# Patient Record
Sex: FEMALE | Race: WHITE | Employment: FULL TIME | URBAN - METROPOLITAN AREA
[De-identification: names, ages, dates, MRNs, and addresses within clinical notes are randomized per-mention and may not be internally consistent; named-entity substitution may affect disease eponyms.]

---

## 2022-03-22 LAB
C. TRACHOMATIS, EXTERNAL RESULT: NEGATIVE
GBS, EXTERNAL RESULT: POSITIVE
HEP B, EXTERNAL RESULT: NEGATIVE
HIV, EXTERNAL RESULT: NEGATIVE
N. GONORRHOEAE, EXTERNAL RESULT: NEGATIVE
RPR, EXTERNAL RESULT: NEGATIVE
RUBELLA TITER, EXTERNAL RESULT: NORMAL

## 2022-10-18 LAB
ABO, EXTERNAL RESULT: NORMAL
RH FACTOR, EXTERNAL RESULT: NEGATIVE

## 2022-10-27 ENCOUNTER — PREP FOR PROCEDURE (OUTPATIENT)
Dept: OBGYN | Age: 37
End: 2022-10-27

## 2022-10-27 RX ORDER — MISOPROSTOL 100 UG/1
800 TABLET ORAL PRN
Status: CANCELLED | OUTPATIENT
Start: 2022-10-27

## 2022-10-27 RX ORDER — METHYLERGONOVINE MALEATE 0.2 MG/ML
200 INJECTION INTRAVENOUS PRN
Status: CANCELLED | OUTPATIENT
Start: 2022-10-27

## 2022-10-27 RX ORDER — ONDANSETRON 2 MG/ML
4 INJECTION INTRAMUSCULAR; INTRAVENOUS EVERY 6 HOURS PRN
Status: CANCELLED | OUTPATIENT
Start: 2022-10-27

## 2022-10-27 RX ORDER — SODIUM CHLORIDE 0.9 % (FLUSH) 0.9 %
5-40 SYRINGE (ML) INJECTION EVERY 12 HOURS SCHEDULED
Status: CANCELLED | OUTPATIENT
Start: 2022-10-27

## 2022-10-27 RX ORDER — SODIUM CHLORIDE 0.9 % (FLUSH) 0.9 %
5-40 SYRINGE (ML) INJECTION PRN
Status: CANCELLED | OUTPATIENT
Start: 2022-10-27

## 2022-10-27 RX ORDER — SODIUM CHLORIDE, SODIUM LACTATE, POTASSIUM CHLORIDE, CALCIUM CHLORIDE 600; 310; 30; 20 MG/100ML; MG/100ML; MG/100ML; MG/100ML
INJECTION, SOLUTION INTRAVENOUS CONTINUOUS
Status: CANCELLED | OUTPATIENT
Start: 2022-10-27

## 2022-10-27 RX ORDER — SODIUM CHLORIDE, SODIUM LACTATE, POTASSIUM CHLORIDE, AND CALCIUM CHLORIDE .6; .31; .03; .02 G/100ML; G/100ML; G/100ML; G/100ML
500 INJECTION, SOLUTION INTRAVENOUS PRN
Status: CANCELLED | OUTPATIENT
Start: 2022-10-27

## 2022-10-27 RX ORDER — SODIUM CHLORIDE 9 MG/ML
25 INJECTION, SOLUTION INTRAVENOUS PRN
Status: CANCELLED | OUTPATIENT
Start: 2022-10-27

## 2022-10-27 RX ORDER — CARBOPROST TROMETHAMINE 250 UG/ML
250 INJECTION, SOLUTION INTRAMUSCULAR PRN
Status: CANCELLED | OUTPATIENT
Start: 2022-10-27

## 2022-10-27 RX ORDER — SODIUM CHLORIDE, SODIUM LACTATE, POTASSIUM CHLORIDE, AND CALCIUM CHLORIDE .6; .31; .03; .02 G/100ML; G/100ML; G/100ML; G/100ML
1000 INJECTION, SOLUTION INTRAVENOUS PRN
Status: CANCELLED | OUTPATIENT
Start: 2022-10-27

## 2022-11-08 ENCOUNTER — ANESTHESIA (OUTPATIENT)
Dept: LABOR AND DELIVERY | Age: 37
End: 2022-11-08
Payer: COMMERCIAL

## 2022-11-08 ENCOUNTER — APPOINTMENT (OUTPATIENT)
Dept: LABOR AND DELIVERY | Age: 37
End: 2022-11-08
Payer: COMMERCIAL

## 2022-11-08 ENCOUNTER — ANESTHESIA EVENT (OUTPATIENT)
Dept: LABOR AND DELIVERY | Age: 37
End: 2022-11-08
Payer: COMMERCIAL

## 2022-11-08 ENCOUNTER — HOSPITAL ENCOUNTER (INPATIENT)
Age: 37
LOS: 2 days | Discharge: HOME | End: 2022-11-10
Attending: OBSTETRICS & GYNECOLOGY | Admitting: OBSTETRICS & GYNECOLOGY
Payer: COMMERCIAL

## 2022-11-08 PROBLEM — Z98.890 STATUS POST INDUCTION OF LABOR: Status: ACTIVE | Noted: 2022-11-08

## 2022-11-08 LAB
ABO/RH: NORMAL
AMPHETAMINE SCREEN, URINE: NORMAL
ANTIBODY IDENTIFICATION: NORMAL
ANTIBODY SCREEN: NORMAL
BARBITURATE SCREEN URINE: NORMAL
BENZODIAZEPINE SCREEN, URINE: NORMAL
BUPRENORPHINE URINE: NORMAL
CANNABINOID SCREEN URINE: NORMAL
COCAINE METABOLITE SCREEN URINE: NORMAL
DAT IGG CAPTURE: NORMAL
FENTANYL SCREEN, URINE: NORMAL
HCT VFR BLD CALC: 32.6 % (ref 36–48)
HEMOGLOBIN: 11.3 G/DL (ref 12–16)
Lab: NORMAL
MCH RBC QN AUTO: 30.3 PG (ref 26–34)
MCHC RBC AUTO-ENTMCNC: 34.5 G/DL (ref 31–36)
MCV RBC AUTO: 87.9 FL (ref 80–100)
METHADONE SCREEN, URINE: NORMAL
OPIATE SCREEN URINE: NORMAL
OXYCODONE URINE: NORMAL
PDW BLD-RTO: 15.3 % (ref 12.4–15.4)
PH UA: 5.5
PHENCYCLIDINE SCREEN URINE: NORMAL
PLATELET # BLD: 273 K/UL (ref 135–450)
PMV BLD AUTO: 8.8 FL (ref 5–10.5)
RBC # BLD: 3.71 M/UL (ref 4–5.2)
TOTAL SYPHILLIS IGG/IGM: NORMAL
WBC # BLD: 8.8 K/UL (ref 4–11)

## 2022-11-08 PROCEDURE — 2500000003 HC RX 250 WO HCPCS: Performed by: NURSE ANESTHETIST, CERTIFIED REGISTERED

## 2022-11-08 PROCEDURE — 86901 BLOOD TYPING SEROLOGIC RH(D): CPT

## 2022-11-08 PROCEDURE — 6360000002 HC RX W HCPCS: Performed by: OBSTETRICS & GYNECOLOGY

## 2022-11-08 PROCEDURE — 85027 COMPLETE CBC AUTOMATED: CPT

## 2022-11-08 PROCEDURE — 51701 INSERT BLADDER CATHETER: CPT

## 2022-11-08 PROCEDURE — 86850 RBC ANTIBODY SCREEN: CPT

## 2022-11-08 PROCEDURE — 1220000000 HC SEMI PRIVATE OB R&B

## 2022-11-08 PROCEDURE — 86922 COMPATIBILITY TEST ANTIGLOB: CPT

## 2022-11-08 PROCEDURE — 2500000003 HC RX 250 WO HCPCS: Performed by: ANESTHESIOLOGY

## 2022-11-08 PROCEDURE — 3E033VJ INTRODUCTION OF OTHER HORMONE INTO PERIPHERAL VEIN, PERCUTANEOUS APPROACH: ICD-10-PCS | Performed by: OBSTETRICS & GYNECOLOGY

## 2022-11-08 PROCEDURE — 86900 BLOOD TYPING SEROLOGIC ABO: CPT

## 2022-11-08 PROCEDURE — 2580000003 HC RX 258: Performed by: OBSTETRICS & GYNECOLOGY

## 2022-11-08 PROCEDURE — 3700000025 EPIDURAL BLOCK: Performed by: ANESTHESIOLOGY

## 2022-11-08 PROCEDURE — 80307 DRUG TEST PRSMV CHEM ANLYZR: CPT

## 2022-11-08 PROCEDURE — 86780 TREPONEMA PALLIDUM: CPT

## 2022-11-08 PROCEDURE — 51702 INSERT TEMP BLADDER CATH: CPT

## 2022-11-08 PROCEDURE — 6360000002 HC RX W HCPCS

## 2022-11-08 PROCEDURE — 59025 FETAL NON-STRESS TEST: CPT

## 2022-11-08 PROCEDURE — 86870 RBC ANTIBODY IDENTIFICATION: CPT

## 2022-11-08 PROCEDURE — 6360000002 HC RX W HCPCS: Performed by: NURSE ANESTHETIST, CERTIFIED REGISTERED

## 2022-11-08 PROCEDURE — 86880 COOMBS TEST DIRECT: CPT

## 2022-11-08 RX ORDER — CARBOPROST TROMETHAMINE 250 UG/ML
250 INJECTION, SOLUTION INTRAMUSCULAR PRN
Status: DISCONTINUED | OUTPATIENT
Start: 2022-11-08 | End: 2022-11-10 | Stop reason: HOSPADM

## 2022-11-08 RX ORDER — LIDOCAINE HYDROCHLORIDE 10 MG/ML
INJECTION, SOLUTION INFILTRATION; PERINEURAL PRN
Status: DISCONTINUED | OUTPATIENT
Start: 2022-11-08 | End: 2022-11-09 | Stop reason: SDUPTHER

## 2022-11-08 RX ORDER — SODIUM CHLORIDE 0.9 % (FLUSH) 0.9 %
5-40 SYRINGE (ML) INJECTION EVERY 12 HOURS SCHEDULED
Status: DISCONTINUED | OUTPATIENT
Start: 2022-11-08 | End: 2022-11-09 | Stop reason: SDUPTHER

## 2022-11-08 RX ORDER — LIDOCAINE HYDROCHLORIDE AND EPINEPHRINE 15; 5 MG/ML; UG/ML
INJECTION, SOLUTION EPIDURAL PRN
Status: DISCONTINUED | OUTPATIENT
Start: 2022-11-08 | End: 2022-11-09 | Stop reason: SDUPTHER

## 2022-11-08 RX ORDER — ONDANSETRON 2 MG/ML
4 INJECTION INTRAMUSCULAR; INTRAVENOUS EVERY 6 HOURS PRN
Status: DISCONTINUED | OUTPATIENT
Start: 2022-11-08 | End: 2022-11-09 | Stop reason: HOSPADM

## 2022-11-08 RX ORDER — METHYLERGONOVINE MALEATE 0.2 MG/ML
200 INJECTION INTRAVENOUS PRN
Status: DISCONTINUED | OUTPATIENT
Start: 2022-11-08 | End: 2022-11-10 | Stop reason: HOSPADM

## 2022-11-08 RX ORDER — SODIUM CHLORIDE 9 MG/ML
25 INJECTION, SOLUTION INTRAVENOUS PRN
Status: DISCONTINUED | OUTPATIENT
Start: 2022-11-08 | End: 2022-11-09 | Stop reason: SDUPTHER

## 2022-11-08 RX ORDER — SODIUM CHLORIDE, SODIUM LACTATE, POTASSIUM CHLORIDE, AND CALCIUM CHLORIDE .6; .31; .03; .02 G/100ML; G/100ML; G/100ML; G/100ML
500 INJECTION, SOLUTION INTRAVENOUS PRN
Status: DISCONTINUED | OUTPATIENT
Start: 2022-11-08 | End: 2022-11-10 | Stop reason: HOSPADM

## 2022-11-08 RX ORDER — EPHEDRINE SULFATE/0.9% NACL/PF 50 MG/5 ML
SYRINGE (ML) INTRAVENOUS PRN
Status: DISCONTINUED | OUTPATIENT
Start: 2022-11-08 | End: 2022-11-09 | Stop reason: SDUPTHER

## 2022-11-08 RX ORDER — MISOPROSTOL 200 UG/1
800 TABLET ORAL PRN
Status: DISCONTINUED | OUTPATIENT
Start: 2022-11-08 | End: 2022-11-10 | Stop reason: HOSPADM

## 2022-11-08 RX ORDER — SODIUM CHLORIDE 0.9 % (FLUSH) 0.9 %
5-40 SYRINGE (ML) INJECTION PRN
Status: DISCONTINUED | OUTPATIENT
Start: 2022-11-08 | End: 2022-11-09 | Stop reason: SDUPTHER

## 2022-11-08 RX ORDER — ONDANSETRON 2 MG/ML
4 INJECTION INTRAMUSCULAR; INTRAVENOUS EVERY 6 HOURS PRN
Status: DISCONTINUED | OUTPATIENT
Start: 2022-11-08 | End: 2022-11-08 | Stop reason: SDUPTHER

## 2022-11-08 RX ORDER — NALOXONE HYDROCHLORIDE 0.4 MG/ML
INJECTION, SOLUTION INTRAMUSCULAR; INTRAVENOUS; SUBCUTANEOUS PRN
Status: DISCONTINUED | OUTPATIENT
Start: 2022-11-08 | End: 2022-11-09 | Stop reason: HOSPADM

## 2022-11-08 RX ORDER — BUPIVACAINE HYDROCHLORIDE 2.5 MG/ML
INJECTION, SOLUTION EPIDURAL; INFILTRATION; INTRACAUDAL PRN
Status: DISCONTINUED | OUTPATIENT
Start: 2022-11-08 | End: 2022-11-09 | Stop reason: SDUPTHER

## 2022-11-08 RX ORDER — SODIUM CHLORIDE, SODIUM LACTATE, POTASSIUM CHLORIDE, AND CALCIUM CHLORIDE .6; .31; .03; .02 G/100ML; G/100ML; G/100ML; G/100ML
1000 INJECTION, SOLUTION INTRAVENOUS PRN
Status: DISCONTINUED | OUTPATIENT
Start: 2022-11-08 | End: 2022-11-10 | Stop reason: HOSPADM

## 2022-11-08 RX ORDER — SODIUM CHLORIDE, SODIUM LACTATE, POTASSIUM CHLORIDE, CALCIUM CHLORIDE 600; 310; 30; 20 MG/100ML; MG/100ML; MG/100ML; MG/100ML
INJECTION, SOLUTION INTRAVENOUS CONTINUOUS
Status: DISCONTINUED | OUTPATIENT
Start: 2022-11-08 | End: 2022-11-09 | Stop reason: SDUPTHER

## 2022-11-08 RX ORDER — DIPHENHYDRAMINE HYDROCHLORIDE 50 MG/ML
12.5 INJECTION INTRAMUSCULAR; INTRAVENOUS ONCE
Status: COMPLETED | OUTPATIENT
Start: 2022-11-08 | End: 2022-11-08

## 2022-11-08 RX ADMIN — DIPHENHYDRAMINE HYDROCHLORIDE 12.5 MG: 50 INJECTION INTRAMUSCULAR; INTRAVENOUS at 22:22

## 2022-11-08 RX ADMIN — Medication 10 MG: at 20:15

## 2022-11-08 RX ADMIN — SODIUM CHLORIDE, SODIUM LACTATE, POTASSIUM CHLORIDE, AND CALCIUM CHLORIDE 500 ML: .6; .31; .03; .02 INJECTION, SOLUTION INTRAVENOUS at 17:00

## 2022-11-08 RX ADMIN — DEXTROSE MONOHYDRATE 5 MILLION UNITS: 5 INJECTION INTRAVENOUS at 08:44

## 2022-11-08 RX ADMIN — LIDOCAINE HYDROCHLORIDE 3 ML: 10 INJECTION, SOLUTION INFILTRATION; PERINEURAL at 14:25

## 2022-11-08 RX ADMIN — Medication 2.5 MILLION UNITS: at 13:31

## 2022-11-08 RX ADMIN — Medication 12 ML/HR: at 23:01

## 2022-11-08 RX ADMIN — Medication 1 MILLI-UNITS/MIN: at 08:40

## 2022-11-08 RX ADMIN — BUPIVACAINE HYDROCHLORIDE 10 ML: 2.5 INJECTION, SOLUTION EPIDURAL; INFILTRATION; INTRACAUDAL at 14:35

## 2022-11-08 RX ADMIN — Medication 2.5 MILLION UNITS: at 22:33

## 2022-11-08 RX ADMIN — SODIUM CHLORIDE, POTASSIUM CHLORIDE, SODIUM LACTATE AND CALCIUM CHLORIDE: 600; 310; 30; 20 INJECTION, SOLUTION INTRAVENOUS at 14:48

## 2022-11-08 RX ADMIN — SODIUM CHLORIDE, POTASSIUM CHLORIDE, SODIUM LACTATE AND CALCIUM CHLORIDE: 600; 310; 30; 20 INJECTION, SOLUTION INTRAVENOUS at 19:29

## 2022-11-08 RX ADMIN — Medication 2.5 MILLION UNITS: at 18:35

## 2022-11-08 RX ADMIN — LIDOCAINE HYDROCHLORIDE AND EPINEPHRINE 3 ML: 15; 5 INJECTION, SOLUTION EPIDURAL at 14:32

## 2022-11-08 RX ADMIN — Medication 12 ML/HR: at 14:50

## 2022-11-08 RX ADMIN — SODIUM CHLORIDE, POTASSIUM CHLORIDE, SODIUM LACTATE AND CALCIUM CHLORIDE: 600; 310; 30; 20 INJECTION, SOLUTION INTRAVENOUS at 08:32

## 2022-11-08 NOTE — ANESTHESIA PROCEDURE NOTES
Epidural Block    Patient location during procedure: OB  Start time: 11/8/2022 2:23 PM  End time: 11/8/2022 2:40 PM  Reason for block: labor epidural  Staffing  Resident/CRNA: Omar Wade [facility], APRN - CRNA  Epidural  Patient position: sitting  Prep: ChloraPrep  Patient monitoring: cardiac monitor and continuous pulse ox  Approach: midline  Location: L3-4  Injection technique: TRINIDAD air  Guidance: paresthesia technique  Provider prep: mask and sterile gloves  Needle  Needle type: Tuohy   Needle gauge: 17 G  Needle length: 3.5 in  Needle insertion depth: 7 cm  Catheter type: side hole  Catheter size: 19 G  Catheter at skin depth: 13 cm  Test dose: negativeCatheter Secured: tegaderm and tape  Assessment  Sensory level: T8  Hemodynamics: stable  Attempts: 1  Outcomes: uncomplicated and patient tolerated procedure well  Preanesthetic Checklist  Completed: patient identified, IV checked, site marked, risks and benefits discussed, surgical/procedural consents, equipment checked, pre-op evaluation, timeout performed, anesthesia consent given, oxygen available, monitors applied/VS acknowledged, fire risk safety assessment completed and verbalized and blood product R/B/A discussed and consented

## 2022-11-08 NOTE — ANESTHESIA PRE PROCEDURE
Department of Anesthesiology  Preprocedure Note       Name:  Thad Trevizo   Age:  44 y.o.  :  1985                                          MRN:  [id number]         Date:  2022      Surgeon: * No surgeons listed *    Procedure: * No procedures listed *    Medications prior to admission:   Prior to Admission medications    Medication Sig Start Date End Date Taking?  Authorizing Provider   Prenatal MV-Min-Fe Fum-FA-DHA (PRENATAL 1 PO) Take by mouth   Yes Historical Provider, MD   metFORMIN (GLUCOPHAGE) 500 MG tablet Take by mouth 3 times daily    Historical Provider, MD   famotidine (PEPCID) 20 MG tablet Take 1 tablet by mouth daily 7/5/15 8/4/15  Rolando Savage MD       Current medications:    Current Facility-Administered Medications   Medication Dose Route Frequency Provider Last Rate Last Admin    lactated ringers infusion   IntraVENous Continuous Ervin Morris  mL/hr at 22 0846 Restarted at 22 0846    lactated ringers bolus  500 mL IntraVENous PRN Ervin Morris MD        Or    lactated ringers bolus  1,000 mL IntraVENous PRN Ervin Morris MD        sodium chloride flush 0.9 % injection 5-40 mL  5-40 mL IntraVENous 2 times per day Ervin Morris MD        sodium chloride flush 0.9 % injection 5-40 mL  5-40 mL IntraVENous PRN Ervin Morris MD        0.9 % sodium chloride infusion  25 mL IntraVENous PRN Ervin Morris MD        oxytocin (PITOCIN) 30 units in 500 mL infusion  1-20 lacey-units/min IntraVENous Continuous Ervin Morris MD 1 mL/hr at 22 0840 1 lacey-units/min at 22 0840    methylergonovine (METHERGINE) injection 200 mcg  200 mcg IntraMUSCular PRN Ervin Morris MD        carboprost [facility]) injection 250 mcg  250 mcg IntraMUSCular PRN Ervin Morris MD        miSOPROStol (CYTOTEC) tablet 800 mcg  800 mcg Rectal PRN Ervin Morris MD        tranexamic acid (CYCLOKAPRON) 1000 mg in sodium chloride 0.9 % 50 mL IVPB  1,000 mg IntraVENous Once PRN Raquel Saxena MD        oxytocin (PITOCIN) 30 units in 500 mL infusion  87.3 lacey-units/min IntraVENous Continuous PRN Raquel Saxena MD        And    oxytocin (PITOCIN) 30 units in 500 mL infusion  10 Units IntraVENous PRN Raquel Saxena MD        ondansetron [facility]) injection 4 mg  4 mg IntraVENous Q6H PRN Raquel Saxena MD        penicillin G potassium in d5w IVPB 2.5 Million Units  2.5 Million Units IntraVENous Q4H Raquel Saxena MD           Allergies:  No Known Allergies    Problem List:    Patient Active Problem List   Diagnosis Code    Biliary colic Z85.84    Calculus of gallbladder with chronic cholecystitis K80.10    Status post induction of labor Z98.890       Past Medical History:        Diagnosis Date    Gall stones        Past Surgical History:        Procedure Laterality Date    CHOLECYSTECTOMY  12/28/15    Laparoscopic cholecystectomy. Social History:    Social History     Tobacco Use    Smoking status: Never    Smokeless tobacco: Never   Substance Use Topics    Alcohol use:  No                                Counseling given: Not Answered      Vital Signs (Current):   Vitals:    11/08/22 0809 11/08/22 0919 11/08/22 1035 11/08/22 1036   BP: (!) 158/88 (!) 140/85 (!) 152/84 (!) 152/84   Pulse: 98 96 95 95   Resp: 18 16     Temp: 36.6 °C (97.9 °F) 36.6 °C (97.9 °F)     TempSrc: Oral Oral     SpO2: 98% 98% 98%                                               BP Readings from Last 3 Encounters:   11/08/22 (!) 152/84   12/29/15 102/67       NPO Status:                                                                                 BMI:   Wt Readings from Last 3 Encounters:   12/29/15 220 lb 7.4 oz (100 kg)     There is no height or weight on file to calculate BMI.    CBC:   Lab Results   Component Value Date/Time    WBC 8.8 11/08/2022 08:30 AM    RBC 3.71 11/08/2022 08:30 AM    HGB 11.3 11/08/2022 08:30 AM    HCT 32.6 11/08/2022 08:30 AM    MCV 87.9 11/08/2022 08:30 AM    RDW 15.3 11/08/2022 08:30 AM     11/08/2022 08:30 AM       CMP:   Lab Results   Component Value Date/Time     12/28/2015 06:19 AM    K 4.1 12/28/2015 06:19 AM     12/28/2015 06:19 AM    CO2 22 12/28/2015 06:19 AM    BUN 9 12/28/2015 06:19 AM    CREATININE 0.8 12/28/2015 06:19 AM    GFRAA >60 12/28/2015 06:19 AM    AGRATIO 1.5 12/27/2015 06:51 AM    LABGLOM >60 12/28/2015 06:19 AM    GLUCOSE 91 12/28/2015 06:19 AM    PROT 7.2 12/27/2015 06:51 AM    CALCIUM 8.9 12/28/2015 06:19 AM    BILITOT 0.5 12/27/2015 06:51 AM    ALKPHOS 74 12/27/2015 06:51 AM    AST 27 12/27/2015 06:51 AM    ALT 33 12/27/2015 06:51 AM       POC Tests: No results for input(s): POCGLU, POCNA, POCK, POCCL, POCBUN, POCHEMO, POCHCT in the last 72 hours. Coags: No results found for: PROTIME, INR, APTT    HCG (If Applicable):   Lab Results   Component Value Date    PREGTESTUR Negative 12/28/2015        ABGs: No results found for: PHART, PO2ART, GVJ5HPZ, VOT4JIZ, BEART, R6LTRIOP     Type & Screen (If Applicable):  No results found for: LABABO, LABRH    Drug/Infectious Status (If Applicable):  No results found for: HIV, HEPCAB    COVID-19 Screening (If Applicable): No results found for: COVID19        Anesthesia Evaluation  Patient summary reviewed  Airway: Mallampati: III  TM distance: >3 FB   Neck ROM: full  Mouth opening: > = 3 FB   Dental: normal exam         Pulmonary:Negative Pulmonary ROS and normal exam                               Cardiovascular:Negative CV ROS                      Neuro/Psych:   Negative Neuro/Psych ROS              GI/Hepatic/Renal: Neg GI/Hepatic/Renal ROS            Endo/Other: Negative Endo/Other ROS                    Abdominal:             Vascular: negative vascular ROS.          Other Findings:           Anesthesia Plan      epidural     ASA 2             Anesthetic plan and risks discussed with patient. Use of blood products discussed with patient whom consented to blood products. [facility] Department of Anesthesiology  Pre-Anesthesia Evaluation/Consultation       Name:  J Carlos Greene                                         Age:  44 y.o. MRN:  [id number]           Procedure (Scheduled):  Labor Epidural  Surgeon:  Dr. Jordan Tafoya     No Known Allergies  Patient Active Problem List   Diagnosis    Biliary colic    Calculus of gallbladder with chronic cholecystitis    Status post induction of labor     Past Medical History:   Diagnosis Date    Gall stones      Past Surgical History:   Procedure Laterality Date    CHOLECYSTECTOMY  12/28/15    Laparoscopic cholecystectomy. Social History     Tobacco Use    Smoking status: Never    Smokeless tobacco: Never   Vaping Use    Vaping Use: Never used   Substance Use Topics    Alcohol use: No    Drug use: No     Medications  No current facility-administered medications on file prior to encounter.      Current Outpatient Medications on File Prior to Encounter   Medication Sig Dispense Refill    Prenatal MV-Min-Fe Fum-FA-DHA (PRENATAL 1 PO) Take by mouth      metFORMIN (GLUCOPHAGE) 500 MG tablet Take by mouth 3 times daily      famotidine (PEPCID) 20 MG tablet Take 1 tablet by mouth daily 30 tablet 0     Current Facility-Administered Medications   Medication Dose Route Frequency Provider Last Rate Last Admin    lactated ringers infusion   IntraVENous Continuous Ovidio Azevedo  mL/hr at 11/08/22 0846 Restarted at 11/08/22 0846    lactated ringers bolus  500 mL IntraVENous PRN Ovidio Azevedo MD        Or    lactated ringers bolus  1,000 mL IntraVENous PRN Ovidio Azevedo MD        sodium chloride flush 0.9 % injection 5-40 mL  5-40 mL IntraVENous 2 times per day Ovidio Azevedo MD        sodium chloride flush 0.9 % injection 5-40 mL  5-40 mL IntraVENous PRN Ralph Null Santa Whaley MD        0.9 % sodium chloride infusion  25 mL IntraVENous PRN Renato Woodall MD        oxytocin (PITOCIN) 30 units in 500 mL infusion  1-20 lacey-units/min IntraVENous Continuous Renato Woodall MD 1 mL/hr at 22 0840 1 lacey-units/min at 22 0840    methylergonovine (METHERGINE) injection 200 mcg  200 mcg IntraMUSCular PRN Renato Woodall MD        carboprost [facility]) injection 250 mcg  250 mcg IntraMUSCular PRN Renato Woodall MD        miSOPROStol (CYTOTEC) tablet 800 mcg  800 mcg Rectal PRN Renato Woodall MD        tranexamic acid (CYCLOKAPRON) 1000 mg in sodium chloride 0.9 % 50 mL IVPB  1,000 mg IntraVENous Once PRN Renato Woodall MD        oxytocin (PITOCIN) 30 units in 500 mL infusion  87.3 lacey-units/min IntraVENous Continuous PRN Renato Woodall MD        And    oxytocin (PITOCIN) 30 units in 500 mL infusion  10 Units IntraVENous PRN Renato Woodall MD        [facility]ron [facility]) injection 4 mg  4 mg IntraVENous Q6H PRN Renato Woodall MD        penicillin G potassium in d5w IVPB 2.5 Million Units  2.5 Million Units IntraVENous Q4H Renato Woodall MD         Vital Signs (Current)   Vitals:    22 1036   BP: (!) 152/84   Pulse: 95   Resp:    Temp:    SpO2:      Vital Signs Statistics (for past 48 hrs)     Temp  Av.6 °C (97.9 °F)  Min: 36.6 °C (97.9 °F)   Min taken time: 22  Max: 36.6 °C (97.9 °F)   Max taken time: 22  Pulse  Av  Min: 95   Min taken time: 22 1036  Max: 98   Max taken time: 22 08  Resp  Av  Min: 12   Min taken time: 22  Max: 18   Max taken time: 22  BP  Min: 140/85   Min taken time: 22  Max: 158/88   Max taken time: 22 0809  SpO2  Av %  Min: 98 %   Min taken time: 22 1035  Max: 98 %   Max taken time: 22 1035    BP Readings from Last 3 Encounters:   22 (!) 152/84   12/29/15 102/67     BMI  There is no height or weight on file to calculate BMI. Estimated body mass index is 36.69 kg/m² as calculated from the following:    Height as of 12/26/15: 5' 5\" (1.651 m). Weight as of 12/29/15: 220 lb 7.4 oz (100 kg). CBC   Lab Results   Component Value Date/Time    WBC 8.8 11/08/2022 08:30 AM    RBC 3.71 11/08/2022 08:30 AM    HGB 11.3 11/08/2022 08:30 AM    HCT 32.6 11/08/2022 08:30 AM    MCV 87.9 11/08/2022 08:30 AM    RDW 15.3 11/08/2022 08:30 AM     11/08/2022 08:30 AM     CMP    Lab Results   Component Value Date/Time     12/28/2015 06:19 AM    K 4.1 12/28/2015 06:19 AM     12/28/2015 06:19 AM    CO2 22 12/28/2015 06:19 AM    BUN 9 12/28/2015 06:19 AM    CREATININE 0.8 12/28/2015 06:19 AM    GFRAA >60 12/28/2015 06:19 AM    AGRATIO 1.5 12/27/2015 06:51 AM    LABGLOM >60 12/28/2015 06:19 AM    GLUCOSE 91 12/28/2015 06:19 AM    PROT 7.2 12/27/2015 06:51 AM    CALCIUM 8.9 12/28/2015 06:19 AM    BILITOT 0.5 12/27/2015 06:51 AM    ALKPHOS 74 12/27/2015 06:51 AM    AST 27 12/27/2015 06:51 AM    ALT 33 12/27/2015 06:51 AM     BMP    Lab Results   Component Value Date/Time     12/28/2015 06:19 AM    K 4.1 12/28/2015 06:19 AM     12/28/2015 06:19 AM    CO2 22 12/28/2015 06:19 AM    BUN 9 12/28/2015 06:19 AM    CREATININE 0.8 12/28/2015 06:19 AM    CALCIUM 8.9 12/28/2015 06:19 AM    GFRAA >60 12/28/2015 06:19 AM    LABGLOM >60 12/28/2015 06:19 AM    GLUCOSE 91 12/28/2015 06:19 AM     POCGlucose  No results for input(s): GLUCOSE in the last 72 hours.    Coags  No results found for: PROTIME, INR, APTT  HCG (If Applicable)   Lab Results   Component Value Date    PREGTESTUR Negative 12/28/2015      ABGs No results found for: PHART, PO2ART, AFL8LOU, NKG2JZG, BEART, U2GUNRIL   Type & Screen (If Applicable)  No results found for: Bee Damon, [facility], APRN - CRNA   11/8/2022

## 2022-11-08 NOTE — FLOWSHEET NOTE
NST      11/08/22 1030   Fetal Heart Rate   Mode   ([place])   Baseline Rate 145 bpm   Baseline Classification Normal   Variability 6-25 BPM   Pattern Accelerations   Patient Feels Fetal Movement Yes   Interventions RN at Bedside   OB Bladder Status Non-distended   OB Bladder Intervention Voiding with Relief   Multiple birth? No   Fetal Monitoring Strip   Fetal Monitoring Mode Wireless   FMS Reviewed?  Yes   FMS Reviewed By? BAUDILIO   Uterine Activity   UA Mode Palpation   Contraction Frequency 1-3   Contraction Duration 40-70   Contraction Intensity Mild   Resting Tone Palpated Soft

## 2022-11-08 NOTE — FLOWSHEET NOTE
Head to toe assessment performed by this RN. VSS. Patient denies headache, blurred vision, dizziness, epigastric pain, nausea and vomiting or shortness of breath. Whiteboard updated, oriented to room, Plan of care discussed with patient, questions and concerns addressed by this RN. RN to inform MD of assessment findings.

## 2022-11-08 NOTE — FLOWSHEET NOTE
CRNA at bedside. Patient agreeable for procedure, verbal consent obtained and timeout performed.  Will monitor

## 2022-11-08 NOTE — FLOWSHEET NOTE
Epidural complete. Patient tolerated well Patient repositioned to laying position with left sided tilt.

## 2022-11-08 NOTE — H&P
Department of Obstetrics and Gynecology   Obstetrics History and Physical        CHIEF COMPLAINT:  induction of labor    HISTORY OF PRESENT ILLNESS:    Janae Quevedo  is a 44 y.o. [id number] female at 45 4/7 weeks presents for induction. Estimated Due Date: Estimated Date of Delivery: 2022    PRENATAL CARE: Complicated by: none    PAST OB HISTORY:  OB History          1    Para        Term                AB        Living             SAB        IAB        Ectopic        Molar        Multiple        Live Births                  Past Medical History:        Diagnosis Date    Gall stones      Past Surgical History:        Procedure Laterality Date    CHOLECYSTECTOMY  12/28/15    Laparoscopic cholecystectomy. Allergies:  Patient has no known allergies. Social History:    Social History     Socioeconomic History    Marital status:      Spouse name: Not on file    Number of children: Not on file    Years of education: Not on file    Highest education level: Not on file   Occupational History    Not on file   Tobacco Use    Smoking status: Never    Smokeless tobacco: Never   Substance and Sexual Activity    Alcohol use: No    Drug use: No    Sexual activity: Yes     Partners: Male   Other Topics Concern    Not on file   Social History Narrative    Not on file     Social Determinants of Health     Financial Resource Strain: Not on file   Food Insecurity: Not on file   Transportation Needs: Not on file   Physical Activity: Not on file   Stress: Not on file   Social Connections: Not on file   Intimate Partner Violence: Not on file   Housing Stability: Not on file     Family History:   No family history on file. Medications Prior to Admission:  Medications Prior to Admission: famotidine (PEPCID) 20 MG tablet, Take 1 tablet by mouth daily    REVIEW OF SYSTEMS:  negative     PHYSICAL EXAM:    There were no vitals filed for this visit.   General appearance:  awake, alert, cooperative, no apparent distress, and appears stated age  Neurologic:  Awake, alert, oriented to name, place and time. Lungs:  No increased work of breathing, good air exchange  Abdomen:  Soft, non tender, gravid, fundal height consistent with the gestational age, EFW by Leopold's maneuvers is 7 lbs. ,  8 oz. Pelvis:  Adequate pelvis  Cervix: 1 cm  Contraction frequency: none  Membranes:  Intact  Labs: CBC:   Lab Results   Component Value Date/Time    WBC 5.0 12/27/2015 06:51 AM    RBC 4.08 12/27/2015 06:51 AM    HGB 12.2 12/27/2015 06:51 AM    HCT 36.7 12/27/2015 06:51 AM    MCV 89.9 12/27/2015 06:51 AM    MCH 29.9 12/27/2015 06:51 AM    MCHC 33.3 12/27/2015 06:51 AM    RDW 14.6 12/27/2015 06:51 AM     12/27/2015 06:51 AM    MPV 8.6 12/27/2015 06:51 AM       ASSESSMENT:  Status post induction of labor    PLAN: Admit  Labor: Routine labor orders  Fetus: Reassuring  GBS: Positive    I have presented reasonable alternatives to the patient's proposed care, treatment, and services. The discussion I have done encompassed risks, benefits, and side effects related to the alternatives and the risks related to not receiving the proposed care, treatment, and services. All questions answered. Patient wishes to proceed. The surgical site was confirmed by the patient and me.       Electronically signed by Joana Lozada MD on 11/8/2022 at 8:05 AM

## 2022-11-08 NOTE — FLOWSHEET NOTE
Patient desires epidural at this time. Anesthesia/MD notified. IVF increased. Patient agreeable for procedure, consent obtained.

## 2022-11-08 NOTE — FLOWSHEET NOTE
Patient admitted to room 2284 for scheduled induction. Patient oriented to room, call light and plan of care. Patient given opportunity to read all appropriate consents. RN reviewed admission process, [facility] and infant safety/security processes and consents were signed. Patient verbalized understanding and questions were answered and addressed.

## 2022-11-09 LAB
ABO/RH: NORMAL
FETAL SCREEN: NORMAL
RHIG LOT NUMBER: NORMAL

## 2022-11-09 PROCEDURE — 7200000001 HC VAGINAL DELIVERY

## 2022-11-09 PROCEDURE — 6360000002 HC RX W HCPCS: Performed by: OBSTETRICS & GYNECOLOGY

## 2022-11-09 PROCEDURE — 6370000000 HC RX 637 (ALT 250 FOR IP): Performed by: OBSTETRICS & GYNECOLOGY

## 2022-11-09 PROCEDURE — 85461 HEMOGLOBIN FETAL: CPT

## 2022-11-09 PROCEDURE — 96372 THER/PROPH/DIAG INJ SC/IM: CPT

## 2022-11-09 PROCEDURE — 1220000000 HC SEMI PRIVATE OB R&B

## 2022-11-09 PROCEDURE — 86900 BLOOD TYPING SEROLOGIC ABO: CPT

## 2022-11-09 PROCEDURE — 86901 BLOOD TYPING SEROLOGIC RH(D): CPT

## 2022-11-09 PROCEDURE — 10907ZC DRAINAGE OF AMNIOTIC FLUID, THERAPEUTIC FROM PRODUCTS OF CONCEPTION, VIA NATURAL OR ARTIFICIAL OPENING: ICD-10-PCS | Performed by: OBSTETRICS & GYNECOLOGY

## 2022-11-09 RX ORDER — SODIUM CHLORIDE 0.9 % (FLUSH) 0.9 %
5-40 SYRINGE (ML) INJECTION EVERY 12 HOURS SCHEDULED
Status: DISCONTINUED | OUTPATIENT
Start: 2022-11-09 | End: 2022-11-10 | Stop reason: HOSPADM

## 2022-11-09 RX ORDER — SODIUM CHLORIDE 9 MG/ML
INJECTION, SOLUTION INTRAVENOUS PRN
Status: DISCONTINUED | OUTPATIENT
Start: 2022-11-09 | End: 2022-11-10 | Stop reason: HOSPADM

## 2022-11-09 RX ORDER — ACETAMINOPHEN 500 MG
1000 TABLET ORAL EVERY 8 HOURS PRN
Status: DISCONTINUED | OUTPATIENT
Start: 2022-11-09 | End: 2022-11-10 | Stop reason: HOSPADM

## 2022-11-09 RX ORDER — IBUPROFEN 800 MG/1
800 TABLET ORAL EVERY 8 HOURS PRN
Status: DISCONTINUED | OUTPATIENT
Start: 2022-11-09 | End: 2022-11-10 | Stop reason: HOSPADM

## 2022-11-09 RX ORDER — DOCUSATE SODIUM 100 MG/1
100 CAPSULE, LIQUID FILLED ORAL 2 TIMES DAILY
Status: DISCONTINUED | OUTPATIENT
Start: 2022-11-09 | End: 2022-11-10 | Stop reason: HOSPADM

## 2022-11-09 RX ORDER — SODIUM CHLORIDE 0.9 % (FLUSH) 0.9 %
5-40 SYRINGE (ML) INJECTION PRN
Status: DISCONTINUED | OUTPATIENT
Start: 2022-11-09 | End: 2022-11-10 | Stop reason: HOSPADM

## 2022-11-09 RX ORDER — LANOLIN 100 %
OINTMENT (GRAM) TOPICAL PRN
Status: DISCONTINUED | OUTPATIENT
Start: 2022-11-09 | End: 2022-11-10 | Stop reason: HOSPADM

## 2022-11-09 RX ORDER — SODIUM CHLORIDE, SODIUM LACTATE, POTASSIUM CHLORIDE, CALCIUM CHLORIDE 600; 310; 30; 20 MG/100ML; MG/100ML; MG/100ML; MG/100ML
INJECTION, SOLUTION INTRAVENOUS CONTINUOUS
Status: DISCONTINUED | OUTPATIENT
Start: 2022-11-09 | End: 2022-11-10 | Stop reason: HOSPADM

## 2022-11-09 RX ADMIN — Medication 87.3 MILLI-UNITS/MIN: at 00:50

## 2022-11-09 RX ADMIN — IBUPROFEN 800 MG: 800 TABLET, FILM COATED ORAL at 11:46

## 2022-11-09 RX ADMIN — BENZOCAINE AND LEVOMENTHOL: 200; 5 SPRAY TOPICAL at 03:28

## 2022-11-09 RX ADMIN — ACETAMINOPHEN 1000 MG: 500 TABLET ORAL at 01:26

## 2022-11-09 RX ADMIN — IBUPROFEN 800 MG: 800 TABLET, FILM COATED ORAL at 18:08

## 2022-11-09 RX ADMIN — HUMAN RHO(D) IMMUNE GLOBULIN 300 MCG: 300 INJECTION, SOLUTION INTRAMUSCULAR at 15:40

## 2022-11-09 RX ADMIN — ACETAMINOPHEN 1000 MG: 500 TABLET ORAL at 13:01

## 2022-11-09 RX ADMIN — Medication 10 UNITS: at 00:50

## 2022-11-09 ASSESSMENT — PAIN DESCRIPTION - LOCATION
LOCATION: BACK
LOCATION: HEAD
LOCATION: BACK

## 2022-11-09 ASSESSMENT — PAIN DESCRIPTION - DESCRIPTORS
DESCRIPTORS: ACHING
DESCRIPTORS: SORE

## 2022-11-09 ASSESSMENT — PAIN - FUNCTIONAL ASSESSMENT
PAIN_FUNCTIONAL_ASSESSMENT: ACTIVITIES ARE NOT PREVENTED
PAIN_FUNCTIONAL_ASSESSMENT: ACTIVITIES ARE NOT PREVENTED

## 2022-11-09 ASSESSMENT — PAIN SCALES - GENERAL
PAINLEVEL_OUTOF10: 3
PAINLEVEL_OUTOF10: 4
PAINLEVEL_OUTOF10: 4
PAINLEVEL_OUTOF10: 2
PAINLEVEL_OUTOF10: 4

## 2022-11-09 ASSESSMENT — PAIN DESCRIPTION - ORIENTATION: ORIENTATION: LOWER

## 2022-11-09 ASSESSMENT — PAIN DESCRIPTION - FREQUENCY: FREQUENCY: INTERMITTENT

## 2022-11-09 ASSESSMENT — PAIN DESCRIPTION - PAIN TYPE: TYPE: ACUTE PAIN

## 2022-11-09 NOTE — ANESTHESIA POSTPROCEDURE EVALUATION
Department of Anesthesiology  Postprocedure Note    Patient: J Carlos Greene  MRN: [id number]  YOB: 1985  Date of evaluation: 11/9/2022      Procedure Summary     Date: 11/08/22 Room / Location:     Anesthesia Start: 9792 Anesthesia Stop: 11/09/22 0045    Procedure: Labor Analgesia Diagnosis:     Scheduled Providers:  Responsible Provider: Ailyn Barnett MD    Anesthesia Type: epidural ASA Status: 2          Anesthesia Type: No value filed.     Mary Phase I: Mary Score: 10    Mary Phase II: Mary Score: 10      Anesthesia Post Evaluation    Patient location during evaluation: floor  Patient participation: complete - patient participated  Level of consciousness: awake and alert  Pain score: 0  Airway patency: patent  Nausea & Vomiting: no vomiting and no nausea  Complications: no  Cardiovascular status: blood pressure returned to baseline and hemodynamically stable  Respiratory status: acceptable and room air  Hydration status: stable

## 2022-11-09 NOTE — FLOWSHEET NOTE
Patient very itchy and states it is uncomfortable. RN placed a call to CRNA to inform and order received for Benadryl. See orders for details.

## 2022-11-09 NOTE — FLOWSHEET NOTE
Dr. Greta Welch called and updated on patient status. MD informed of patient SVE, Late decelerations noted per monitor and that patient has been repositioned. MD informed that patient is on bag 3 of IVF and MD states okay to give IVF bolus. MD informed that patient is on 18 milliunits of pitocin. MD reviewed strip and states okay for patient to continue on pitocin as ordered. MD also states it is okay to place garcia cath versus straight for next bladder intervention. RN to continue to monitor.

## 2022-11-09 NOTE — FLOWSHEET NOTE
Patient last /65. Patient is asymptomatice, but late decelerations are noted. IVF bolus has already been given. RN informed CRNA and CRNA will be to the bedside.

## 2022-11-09 NOTE — LACTATION NOTE
This note was copied from a baby's chart. LC called to room for feeding attempt. Mother states infant gets up to breast and becomes sleepy. Discussed gentle wake up techniques and things to try to get infant to wake up. Encouraged mother to hand express drops to infant, especially if he is too sleepy to nurse right now. Discussed first 24 hour care plan and normal  feeding/sleeping patterns. Reassurance and support given. Infant remained sleepy throughout attempt. Encouraged mother to try again when cues are shown, or in a few hours.

## 2022-11-09 NOTE — FLOWSHEET NOTE
Dr. Barron Sanchez at the bedside, SVE performed. FHR strip reviewed per MD. Patient increase 20 milliunits of pitocin per order and patient to remain at 20 milliunits. RN instructed to recheck patient in 2 hours.

## 2022-11-09 NOTE — FLOWSHEET NOTE
Updated provider about EFM strip. Dr. Greta Welch would like patient to have VE. Oncoming nurse will call and update provider with results.

## 2022-11-09 NOTE — FLOWSHEET NOTE
Infant transported to [facility] room 2259 in mother's arms. Plan of care discussed with parents.

## 2022-11-09 NOTE — FLOWSHEET NOTE
Spontaneous vaginal delivery of viable male infant at 56. Infant dried, stimulated and bulb suctioned. Weak cry noted. Infant taken to radiant warmer for further assessment and stimulation.

## 2022-11-09 NOTE — ANESTHESIA POSTPROCEDURE EVALUATION
Department of Anesthesiology  Postprocedure Note    Patient: Thad Trevizo  MRN: [id number]  YOB: 1985  Date of evaluation: 11/9/2022      Procedure Summary     Date: 11/08/22 Room / Location:     Anesthesia Start: 6098 Anesthesia Stop: 11/09/22 0045    Procedure: Labor Analgesia Diagnosis:     Scheduled Providers:  Responsible Provider: Benedict Aldrich MD    Anesthesia Type: epidural ASA Status: 2          Anesthesia Type: No value filed.     Mary Phase I:      Mary Phase II:        Anesthesia Post Evaluation    Patient location during evaluation: floor  Patient participation: complete - patient participated  Level of consciousness: awake and alert  Pain score: 0  Nausea & Vomiting: no nausea and no vomiting  Complications: no  Cardiovascular status: hemodynamically stable  Respiratory status: acceptable  Hydration status: stable

## 2022-11-09 NOTE — FLOWSHEET NOTE
Patient transported to [facility] room 2259 via wheelchair holding infant, Patient oriented to room. Plan of care discussed and call light within reach. RN to continue to monitor.

## 2022-11-09 NOTE — FLOWSHEET NOTE
Variable and late decelerations noted. SVE performed and patient is 8/90/0. Patient has been repositioned and patient also feeling pressure. Dr. Geraldine York updated and RN to continue with plan of care.

## 2022-11-09 NOTE — PLAN OF CARE
Problem: Vaginal Birth or  Section  Goal: Fetal and maternal status remain reassuring during the birth process  Description:  Birth OB-Pregnancy care plan goal which identifies if the fetal and maternal status remain reassuring during the birth process  2022 by Claudine Ramirez RN  Outcome: Progressing     Problem: Pain  Goal: Verbalizes/displays adequate comfort level or baseline comfort level  2022 by Claudine Ramirez RN  Outcome: Progressing     Problem: Infection - Adult  Goal: Absence of infection at discharge  2022 by Claudine Ramirez RN  Outcome: Progressing     Problem: Safety - Adult  Goal: Free from fall injury  2022 by Claudine Ramirez RN  Outcome: Progressing

## 2022-11-09 NOTE — L&D DELIVERY SUMMARY NOTE
Department of Obstetrics and Gynecology  Spontaneous Vaginal Delivery Note      Pre-operative Diagnosis:  Term pregnancy and Induced labor    Post-operative Diagnosis:  Living  infant(s) and Male    Information for the patient's :  Lashae Alejandro [[id number]]                  Infant Wt:   Information for the patient's :  Lashae Alejandro [[id number]]         APGARS:     Information for the patient's :  Lashae Alejandro [[id number]]         Anesthesia:  epidural anesthesia    Application and Delivery:  45 yo  at 39 2/7 weeks, IOL at term. Pitocin, AROM. Progressed to complete, 3 pushes,  over intact perineum. Viable male, skin to skin, delayed cord clamping, terminal meconium. Placenta spont intact, uterus explored.  ml. Intake/Output:     Date 22 0701 - 22 0700 22 0701 - 11/10/22 0700   Shift 2294-2198 9022-7925 24 Hour Total 2641-2293 6282-2670 24 Hour Total   INTAKE   Shift Total         OUTPUT   Urine 250 200 450      Shift Total 250 200 450      NET -250 -200 -450          Condition:  infant stable to general nursery and mother stable    Blood Type and Rh: O NEG        Rubella Immunity Status:   Immune           Infant Feeding:    breast    Attending Attestation: I performed the procedure.

## 2022-11-09 NOTE — LACTATION NOTE
This note was copied from a baby's chart. Lactation Progress Note  Initial Consult    Data: Referral received per RN. Action: LC to room. Mother states agreeable to consult from [facility] at this time. I reviewed Care Plan for First 24 Hours of Life already in patient binder. Discussed recognizing hunger cues and offering the breast when cues are shown. Encouraged breastfeeding on demand and attempting/offering at least every 3 hours. Informed infant may have one 5 hour stretch of sleep in a 24 hour period. Encouraged unlimited skin to skin contact with infant and reviewed benefits including better temperature, heart rate, respiration, blood pressure, and blood sugar regulation. Also increased bonding and milk supply associated with skin to skin contact. Discussed feeding positions, latch on techniques, signs of milk transfer, output goals and normal feeding/sleeping behaviors. I referred mother to binder for additional information about breastfeeding and skin to skin contact. Discussed hand expression and encouraged mother to practice getting drops to infant today. Reinforced importance of positioning infant nose to nipple, belly to belly, waiting for wide open mouth, and bringing baby onto breast to ensure a deep latch. Discussed importance of obtaining deep latch to ensure proper milk transfer, milk production and supply and maternal comfort. I taught and mother returned demo of corner latch to improve latch. Mother has breastfeeding hx of about a year with previous child (now 13 years). Mother already has a new breast pump for home use. Gave breastfeeding booklet along with additional resources for after discharge. I wrote my name and circled the phone number on patient's whiteboard, provided a lactation consultant business card, directed mother to [facility] [employer] for evidence based information, and encouraged mother to call with any lactation needs. Response:   Mother verbalizes understanding of information given and denies further needs at this time.

## 2022-11-09 NOTE — FLOWSHEET NOTE
Patient assisted up to bathroom x 2 assist and patient tolerated well. Patient voiding adequately and verbalizes relief. Pericare provided, undergarments, ice pack and tucks pads placed and gown changed.

## 2022-11-09 NOTE — FLOWSHEET NOTE
Informed CRNA of patient blood pressure and EFM strip. Patient's blood pressure is low about 120/60 but patient is asymptomatic. CRNA will continue to monitor for now.

## 2022-11-10 VITALS
HEART RATE: 94 BPM | RESPIRATION RATE: 18 BRPM | DIASTOLIC BLOOD PRESSURE: 86 MMHG | TEMPERATURE: 97.6 F | SYSTOLIC BLOOD PRESSURE: 140 MMHG | OXYGEN SATURATION: 96 %

## 2022-11-10 LAB
BLOOD BANK DISPENSE STATUS: NORMAL
BLOOD BANK DISPENSE STATUS: NORMAL
BLOOD BANK PRODUCT CODE: NORMAL
BLOOD BANK PRODUCT CODE: NORMAL
BPU ID: NORMAL
BPU ID: NORMAL
DESCRIPTION BLOOD BANK: NORMAL
DESCRIPTION BLOOD BANK: NORMAL

## 2022-11-10 NOTE — PLAN OF CARE
Problem: Postpartum  Goal: Experiences normal postpartum course  Description:  Postpartum OB-Pregnancy care plan goal which identifies if the mother is experiencing a normal postpartum course  11/10/2022 1237 by Tad Suarez RN  Outcome: Completed  11/10/2022 0527 by Chon Giang RN  Outcome: Progressing  Goal: Appropriate maternal -  bonding  Description:  Postpartum OB-Pregnancy care plan goal which identifies if the mother and  are bonding appropriately  11/10/2022 1237 by Tad Suarez RN  Outcome: Completed  11/10/2022 0527 by Chon Giang RN  Outcome: Progressing  Goal: Establishment of infant feeding pattern  Description:  Postpartum OB-Pregnancy care plan goal which identifies if the mother is establishing a feeding pattern with their   11/10/2022 1237 by Tad Suarez RN  Outcome: Completed  11/10/2022 0527 by Chon Giang RN  Outcome: Progressing  Goal: Incisions, wounds, or drain sites healing without S/S of infection  11/10/2022 123 by Tad Suarez RN  Outcome: Completed  11/10/2022 0527 by Chon Giang RN  Outcome: Progressing     Problem: Pain  Goal: Verbalizes/displays adequate comfort level or baseline comfort level  11/10/2022 1237 by Tad Suarez RN  Outcome: Completed  11/10/2022 0527 by Chon Giang RN  Outcome: Progressing  Flowsheets (Taken 2022 1600 by Tad Suarez RN)  Verbalizes/displays adequate comfort level or baseline comfort level:   Encourage patient to monitor pain and request assistance   Assess pain using appropriate pain scale     Problem: Infection - Adult  Goal: Absence of infection at discharge  Outcome: Completed  Goal: Absence of infection during hospitalization  Outcome: Completed  Goal: Absence of fever/infection during anticipated neutropenic period  Outcome: Completed     Problem: Safety - Adult  Goal: Free from fall injury  11/10/2022 1237 by Tad Suarez RN  Outcome: Completed  11/10/2022 0527 by Chon Giang RN  Outcome: Progressing     Problem: Discharge Planning  Goal: Discharge to home or other facility with appropriate resources  Outcome: Completed     Problem: Chronic Conditions and Co-morbidities  Goal: Patient's chronic conditions and co-morbidity symptoms are monitored and maintained or improved  Outcome: Completed

## 2022-11-10 NOTE — PLAN OF CARE
Problem: Postpartum  Goal: Experiences normal postpartum course  Description:  Postpartum OB-Pregnancy care plan goal which identifies if the mother is experiencing a normal postpartum course  Outcome: Progressing  Goal: Appropriate maternal -  bonding  Description:  Postpartum OB-Pregnancy care plan goal which identifies if the mother and  are bonding appropriately  Outcome: Progressing  Goal: Establishment of infant feeding pattern  Description:  Postpartum OB-Pregnancy care plan goal which identifies if the mother is establishing a feeding pattern with their   Outcome: Progressing  Goal: Incisions, wounds, or drain sites healing without S/S of infection  Outcome: Progressing     Problem: Pain  Goal: Verbalizes/displays adequate comfort level or baseline comfort level  Outcome: Progressing  Flowsheets (Taken 2022 1600 by King Tamayo RN)  Verbalizes/displays adequate comfort level or baseline comfort level:   Encourage patient to monitor pain and request assistance   Assess pain using appropriate pain scale     Problem: Safety - Adult  Goal: Free from fall injury  Outcome: Progressing

## 2022-11-10 NOTE — DISCHARGE SUMMARY
Department of Obstetrics and Gynecology  Postpartum Discharge Summary      Admit Date: 2022    Admit Diagnosis: Status post induction of labor [Z98.890]    Discharge Date: 11/10/2022    D/C summary begun at delivery for D/C planning purposes, any delay in discharge from ordered D/C date due to  factors. Discharge Diagnoses: spontaneous vaginal delivery       Medication List        CONTINUE taking these medications      famotidine 20 MG tablet  Commonly known as: Pepcid  Take 1 tablet by mouth daily     PRENATAL 1 PO            STOP taking these medications      metFORMIN 500 MG tablet  Commonly known as: GLUCOPHAGE              Service: Obstetrics    Consults: None    Significant Diagnostic Studies: none    Postpartum complications: none     Condition at Discharge: [facility]: uncomplicated    Discharge Instructions: Activity: no sex for 6 weeks, no driving while on analgesics, no heavy lifting for 6 weeks, and as tolerated    Diet: regular diet    Instructions: No intercourse and nothing in the vagina for 6 weeks. Do not drive while using pain medications.  Keep any wounds clean and dry    Discharge to: Home    Disposition / Follow up: Return to office in 6 weeks    Home Health Nurse visit within 24-48 h if qualifies     Data:  Weight   Information for the patient's :  Christiano Abbott [[id number]]   Birthweight: 8 lb 3 oz (3.715 kg)   Apgars   Information for the patient's :  Christiano Abbott [[id number]]       Home with mother    Electronically signed by Veronica Greene MD on 11/10/2022 at 12:34 PM

## 2022-11-10 NOTE — DISCHARGE INSTRUCTIONS
Thank you for the opportunity to care for you and your family. We hope we always exceeded your expectations and provided very good care during your stay in the [facility]. We want to ensure that you have the help you need when you leave the hospital. If there is anything we can assist you with please let us know. Please call and schedule an appointment to be seen by your obstetrician as scheduled. You will need to call and make your own appointment. For breastfeeding moms, you can contact our lactation consultants with any problems or questions. Please call [phone] for questions. Diet  Eat a well balanced diet focusing on foods high in fiber and protein. Drink plenty of fluids, especially water. To avoid constipation you may take a mild stool softener as recommended by your doctor or midwife. Activity  Gradually increase your activity. Resume exercise only after advise by your doctor or midwife. Avoid lifting anything heavier than your baby for 2 weeks. Avoid driving for 5-7 days or when not taking narcotics. Rise slowly from a lying to sitting and then a standing position. Climb stairs one at a time. Use caution when carrying your baby up and down the stairs. NO SEXUAL activity for 6 weeks or until advised by your doctor. Nothing in the vagina. No tampons, no douches and no intercourse. Be prepared to discuss family planning at your follow-up OB visit. You may feel tired or have a lack of energy. You may continue your prenatal vitamin to replenish nutrients post delivery. Nap when your baby naps to catch up on sleep. EMOTIONS  You may feel swanson, sad, teary and/or overwhelmed. Contact your OB provider if you feel you may be showing signs of postpartum depression or have thoughts of harming yourself or your baby. If infant will not stop crying, contact another adult for help. Place the infant in his/her crib and step away for a break.   NEVER shake your baby.      BLEEDING  Vaginal bleeding will decrease in amount over the next few weeks. You will notice that as your activity increases, you flow may increase. This is your body's way of telling you to \"take it easy\" and rest.  Call your OB if you are saturating more than one maxi pad in an hour for 2 hours and resting does not help. Also call if your bleeding has a foul odor or you are passing clots larger than a lemon on several occasions. BREAST CARE  Take pain medications as needed and as prescribed by your [facility] provider for pain. If you develop a warm, red, tender area on your breast or develop a fever contact your OB provider. For breastfeeding mothers: If you become engorged, feeding may be more difficult or painful for 1-2 days. You may find it helpful to hand express some milk. Hand expressing may make it easier for your baby to latch. While breastfeeding, continue to take your prenatal vitamins as directed by your doctor or midwife. Refer to the breastfeeding booklet in the  folder/binder for more information. For any questions or concerns please contact a Lactation Consultant. Leave a message and your call will be returned. INCISIONAL CARE/ EPISIOTOMY CARE    Episiotomy (azael-care): Use the azael-bottle after toileting, until your bleeding stops. Cleanse your perineum from front to back. If used, stitches will dissolve in 4-6 weeks. You may use a sitz bath or soak in a clean tub with antibacterial soap as needed for comfort. Kegel exercises will help restore bladder control. SWELLING  Try to keep your legs elevated when you are sitting. When lying down, keep your legs elevated. When wearing stockings or socks, make sure they are not too tight. WHEN TO CALL THE DOCTOR  If you have a temperature of 100.6 or more. If your bleeding has increased and you are saturating a pad or more in 1 hour for 2 hours. Your abdomen is tender to the touch.   You are passing blood clots bigger than the size of a lemon for several occasions. If you are experiencing extreme weakness or dizziness. If you are having flu-like symptoms: achy muscles or joints. If there is a foul-smell or green color to your vaginal bleeding. If you have pain that cannot be relieved. You have persistent burning with urination or frequency. Call if you have concerns about your well-being. You are unable to sleep, eat or are having thoughts of harming yourself or your baby. You have swelling, bleeding, drainage, foul odor, redness or warmth in/around your incision or stitches. You have a red, warm or tender area in your calf. Headache that rest and Tylenol does not relieve.